# Patient Record
Sex: MALE | Race: WHITE | NOT HISPANIC OR LATINO | Employment: OTHER | URBAN - METROPOLITAN AREA
[De-identification: names, ages, dates, MRNs, and addresses within clinical notes are randomized per-mention and may not be internally consistent; named-entity substitution may affect disease eponyms.]

---

## 2018-10-15 ENCOUNTER — HOSPITAL ENCOUNTER (OUTPATIENT)
Facility: HOSPITAL | Age: 58
Discharge: HOME | End: 2018-10-17
Attending: EMERGENCY MEDICINE | Admitting: HOSPITALIST
Payer: MEDICAID

## 2018-10-15 DIAGNOSIS — K62.1 RECTAL POLYP: Primary | ICD-10-CM

## 2018-10-15 DIAGNOSIS — I10 ESSENTIAL HYPERTENSION: ICD-10-CM

## 2018-10-15 DIAGNOSIS — K62.5 BRIGHT RED BLOOD PER RECTUM: ICD-10-CM

## 2018-10-15 PROCEDURE — 99285 EMERGENCY DEPT VISIT HI MDM: CPT

## 2018-10-16 PROBLEM — I10 ESSENTIAL HYPERTENSION: Status: ACTIVE | Noted: 2018-10-16

## 2018-10-16 PROBLEM — K62.5 BRIGHT RED BLOOD PER RECTUM: Status: ACTIVE | Noted: 2018-10-16

## 2018-10-16 LAB
ABO + RH BLD: NORMAL
ALBUMIN SERPL BCP-MCNC: 4.2 G/DL
ALP SERPL-CCNC: 72 U/L
ALT SERPL W/O P-5'-P-CCNC: 11 U/L
AMPHET+METHAMPHET UR QL: NEGATIVE
ANION GAP SERPL CALC-SCNC: 9 MMOL/L
APTT BLDCRRT: 29.5 SEC
AST SERPL-CCNC: 8 U/L
BARBITURATES UR QL SCN>200 NG/ML: NEGATIVE
BASOPHILS # BLD AUTO: 0.05 K/UL
BASOPHILS # BLD AUTO: 0.06 K/UL
BASOPHILS NFR BLD: 0.5 %
BASOPHILS NFR BLD: 0.5 %
BENZODIAZ UR QL SCN>200 NG/ML: NEGATIVE
BILIRUB SERPL-MCNC: 0.5 MG/DL
BILIRUB UR QL STRIP: NEGATIVE
BLD GP AB SCN CELLS X3 SERPL QL: NORMAL
BUN SERPL-MCNC: 14 MG/DL
BZE UR QL SCN: NORMAL
CALCIUM SERPL-MCNC: 9.6 MG/DL
CANNABINOIDS UR QL SCN: NEGATIVE
CHLORIDE SERPL-SCNC: 106 MMOL/L
CLARITY UR: CLEAR
CO2 SERPL-SCNC: 24 MMOL/L
COLOR UR: ABNORMAL
CREAT SERPL-MCNC: 1 MG/DL
CREAT UR-MCNC: 41.6 MG/DL
DIFFERENTIAL METHOD: ABNORMAL
DIFFERENTIAL METHOD: NORMAL
EOSINOPHIL # BLD AUTO: 0.5 K/UL
EOSINOPHIL # BLD AUTO: 0.6 K/UL
EOSINOPHIL NFR BLD: 5 %
EOSINOPHIL NFR BLD: 5 %
ERYTHROCYTE [DISTWIDTH] IN BLOOD BY AUTOMATED COUNT: 13.2 %
ERYTHROCYTE [DISTWIDTH] IN BLOOD BY AUTOMATED COUNT: 13.3 %
EST. GFR  (AFRICAN AMERICAN): >60 ML/MIN/1.73 M^2
EST. GFR  (NON AFRICAN AMERICAN): >60 ML/MIN/1.73 M^2
ETHANOL SERPL-MCNC: <10 MG/DL
GLUCOSE SERPL-MCNC: 91 MG/DL
GLUCOSE UR QL STRIP: NEGATIVE
HCT VFR BLD AUTO: 42.8 %
HCT VFR BLD AUTO: 43.3 %
HGB BLD-MCNC: 14.7 G/DL
HGB BLD-MCNC: 14.9 G/DL
HGB UR QL STRIP: ABNORMAL
HIV1+2 IGG SERPL QL IA.RAPID: NEGATIVE
INR PPP: 1
KETONES UR QL STRIP: NEGATIVE
LEUKOCYTE ESTERASE UR QL STRIP: NEGATIVE
LIPASE SERPL-CCNC: 43 U/L
LYMPHOCYTES # BLD AUTO: 2.3 K/UL
LYMPHOCYTES # BLD AUTO: 2.6 K/UL
LYMPHOCYTES NFR BLD: 22.7 %
LYMPHOCYTES NFR BLD: 23 %
MCH RBC QN AUTO: 28.8 PG
MCH RBC QN AUTO: 29.3 PG
MCHC RBC AUTO-ENTMCNC: 34.3 G/DL
MCHC RBC AUTO-ENTMCNC: 34.4 G/DL
MCV RBC AUTO: 84 FL
MCV RBC AUTO: 85 FL
METHADONE UR QL SCN>300 NG/ML: NEGATIVE
MICROSCOPIC COMMENT: ABNORMAL
MONOCYTES # BLD AUTO: 0.9 K/UL
MONOCYTES # BLD AUTO: 1.2 K/UL
MONOCYTES NFR BLD: 10.6 %
MONOCYTES NFR BLD: 8.7 %
NEUTROPHILS # BLD AUTO: 6.3 K/UL
NEUTROPHILS # BLD AUTO: 6.9 K/UL
NEUTROPHILS NFR BLD: 60.6 %
NEUTROPHILS NFR BLD: 63.1 %
NITRITE UR QL STRIP: NEGATIVE
OPIATES UR QL SCN: NEGATIVE
PCP UR QL SCN>25 NG/ML: NEGATIVE
PH UR STRIP: 7 [PH] (ref 5–8)
PLATELET # BLD AUTO: 238 K/UL
PLATELET # BLD AUTO: 242 K/UL
PMV BLD AUTO: 11 FL
PMV BLD AUTO: 11.1 FL
POTASSIUM SERPL-SCNC: 3.7 MMOL/L
PROT SERPL-MCNC: 7 G/DL
PROT UR QL STRIP: NEGATIVE
PROTHROMBIN TIME: 10.2 SEC
RBC # BLD AUTO: 5.01 M/UL
RBC # BLD AUTO: 5.18 M/UL
RBC #/AREA URNS HPF: 50 /HPF (ref 0–4)
SODIUM SERPL-SCNC: 139 MMOL/L
SP GR UR STRIP: 1.01 (ref 1–1.03)
TOXICOLOGY INFORMATION: NORMAL
URN SPEC COLLECT METH UR: ABNORMAL
UROBILINOGEN UR STRIP-ACNC: NEGATIVE EU/DL
WBC # BLD AUTO: 11.44 K/UL
WBC # BLD AUTO: 9.99 K/UL

## 2018-10-16 PROCEDURE — 36415 COLL VENOUS BLD VENIPUNCTURE: CPT

## 2018-10-16 PROCEDURE — 96360 HYDRATION IV INFUSION INIT: CPT

## 2018-10-16 PROCEDURE — 85025 COMPLETE CBC W/AUTO DIFF WBC: CPT

## 2018-10-16 PROCEDURE — 25000003 PHARM REV CODE 250: Performed by: EMERGENCY MEDICINE

## 2018-10-16 PROCEDURE — 80307 DRUG TEST PRSMV CHEM ANLYZR: CPT

## 2018-10-16 PROCEDURE — 80053 COMPREHEN METABOLIC PANEL: CPT

## 2018-10-16 PROCEDURE — 85610 PROTHROMBIN TIME: CPT

## 2018-10-16 PROCEDURE — 80320 DRUG SCREEN QUANTALCOHOLS: CPT

## 2018-10-16 PROCEDURE — 86703 HIV-1/HIV-2 1 RESULT ANTBDY: CPT

## 2018-10-16 PROCEDURE — 96361 HYDRATE IV INFUSION ADD-ON: CPT

## 2018-10-16 PROCEDURE — G0378 HOSPITAL OBSERVATION PER HR: HCPCS

## 2018-10-16 PROCEDURE — 25000003 PHARM REV CODE 250: Performed by: INTERNAL MEDICINE

## 2018-10-16 PROCEDURE — 85730 THROMBOPLASTIN TIME PARTIAL: CPT

## 2018-10-16 PROCEDURE — 86850 RBC ANTIBODY SCREEN: CPT

## 2018-10-16 PROCEDURE — 83690 ASSAY OF LIPASE: CPT

## 2018-10-16 PROCEDURE — 81000 URINALYSIS NONAUTO W/SCOPE: CPT

## 2018-10-16 RX ORDER — BENAZEPRIL HYDROCHLORIDE 10 MG/1
10 TABLET ORAL DAILY
Status: DISCONTINUED | OUTPATIENT
Start: 2018-10-16 | End: 2018-10-17 | Stop reason: HOSPADM

## 2018-10-16 RX ORDER — HYDROCHLOROTHIAZIDE 12.5 MG/1
12.5 TABLET ORAL DAILY
Status: DISCONTINUED | OUTPATIENT
Start: 2018-10-16 | End: 2018-10-16

## 2018-10-16 RX ORDER — POLYETHYLENE GLYCOL 3350, SODIUM SULFATE ANHYDROUS, SODIUM BICARBONATE, SODIUM CHLORIDE, POTASSIUM CHLORIDE 236; 22.74; 6.74; 5.86; 2.97 G/4L; G/4L; G/4L; G/4L; G/4L
4000 POWDER, FOR SOLUTION ORAL ONCE
Status: DISCONTINUED | OUTPATIENT
Start: 2018-10-16 | End: 2018-10-16

## 2018-10-16 RX ORDER — ACETAMINOPHEN 325 MG/1
650 TABLET ORAL EVERY 8 HOURS PRN
Status: DISCONTINUED | OUTPATIENT
Start: 2018-10-16 | End: 2018-10-17 | Stop reason: HOSPADM

## 2018-10-16 RX ORDER — FAMOTIDINE 20 MG/1
20 TABLET, FILM COATED ORAL DAILY
Status: DISCONTINUED | OUTPATIENT
Start: 2018-10-16 | End: 2018-10-17 | Stop reason: HOSPADM

## 2018-10-16 RX ORDER — AMOXICILLIN 250 MG
1 CAPSULE ORAL 2 TIMES DAILY
Status: DISCONTINUED | OUTPATIENT
Start: 2018-10-16 | End: 2018-10-17 | Stop reason: HOSPADM

## 2018-10-16 RX ORDER — POLYETHYLENE GLYCOL 3350 17 G/17G
17 POWDER, FOR SOLUTION ORAL DAILY
Status: DISCONTINUED | OUTPATIENT
Start: 2018-10-16 | End: 2018-10-16

## 2018-10-16 RX ORDER — POLYETHYLENE GLYCOL 3350, SODIUM SULFATE ANHYDROUS, SODIUM BICARBONATE, SODIUM CHLORIDE, POTASSIUM CHLORIDE 236; 22.74; 6.74; 5.86; 2.97 G/4L; G/4L; G/4L; G/4L; G/4L
2000 POWDER, FOR SOLUTION ORAL
Status: COMPLETED | OUTPATIENT
Start: 2018-10-16 | End: 2018-10-17

## 2018-10-16 RX ORDER — LORAZEPAM 2 MG/ML
2 INJECTION INTRAMUSCULAR
Status: ACTIVE | OUTPATIENT
Start: 2018-10-16 | End: 2018-10-16

## 2018-10-16 RX ORDER — ONDANSETRON 2 MG/ML
4 INJECTION INTRAMUSCULAR; INTRAVENOUS EVERY 4 HOURS PRN
Status: DISCONTINUED | OUTPATIENT
Start: 2018-10-16 | End: 2018-10-17 | Stop reason: HOSPADM

## 2018-10-16 RX ORDER — LORAZEPAM 2 MG/ML
INJECTION INTRAMUSCULAR
Status: DISCONTINUED
Start: 2018-10-16 | End: 2018-10-16 | Stop reason: WASHOUT

## 2018-10-16 RX ORDER — SODIUM CHLORIDE 0.9 % (FLUSH) 0.9 %
3 SYRINGE (ML) INJECTION
Status: DISCONTINUED | OUTPATIENT
Start: 2018-10-16 | End: 2018-10-17 | Stop reason: HOSPADM

## 2018-10-16 RX ORDER — SODIUM CHLORIDE 9 MG/ML
INJECTION, SOLUTION INTRAVENOUS CONTINUOUS
Status: DISCONTINUED | OUTPATIENT
Start: 2018-10-16 | End: 2018-10-16

## 2018-10-16 RX ADMIN — HYDROCHLOROTHIAZIDE 12.5 MG: 12.5 TABLET ORAL at 08:10

## 2018-10-16 RX ADMIN — DOCUSATE SODIUM AND SENNOSIDES 1 TABLET: 8.6; 5 TABLET, FILM COATED ORAL at 08:10

## 2018-10-16 RX ADMIN — POLYETHYLENE GLYCOL 3350, SODIUM SULFATE ANHYDROUS, SODIUM BICARBONATE, SODIUM CHLORIDE, POTASSIUM CHLORIDE 2000 ML: 236; 22.74; 6.74; 5.86; 2.97 POWDER, FOR SOLUTION ORAL at 06:10

## 2018-10-16 RX ADMIN — FAMOTIDINE 20 MG: 20 TABLET ORAL at 08:10

## 2018-10-16 RX ADMIN — BENAZEPRIL HYDROCHLORIDE 10 MG: 10 TABLET, FILM COATED ORAL at 08:10

## 2018-10-16 RX ADMIN — ACETAMINOPHEN 650 MG: 325 TABLET ORAL at 07:10

## 2018-10-16 RX ADMIN — SODIUM CHLORIDE: 0.9 INJECTION, SOLUTION INTRAVENOUS at 04:10

## 2018-10-16 NOTE — HPI
"57 yo male with history for hypertension, constipation and hemorrhoids who came to hospital for blood per rectum. Reports intermittent for past 2 months would have blood on toilet paper. Denies abdominal pain, NVD, fever/chills, or weight lost.  Never had colonoscopy. In ED, rectal "golf ball sized" mass reduced. Hemoglobin stable. GI plan for colonoscopy tomorrow.   "

## 2018-10-16 NOTE — ED PROVIDER NOTES
"Encounter Date: 10/15/2018     This is a SORT/MSE of a 58 y.o. male presenting to the ED with c/o rectal bleeding and pain since 3:30 pm. Bright red blood noted falling from patient's shorts in triage. Patient denies ever having a colonoscopy. Care will be transferred to an alternate provider when patient is roomed for a full evaluation and final disposition. Patient is aware that he/she is awaiting a room in the emergency department, where another provider will review results, evaluate and treat as needed.   WELLINGTON Clancy DNP     History     Chief Complaint   Patient presents with    Rectal Bleeding     Hx of hemorrhoids. Rectal bleeding since 1500. Denies abd pain, dizziness     This is an emergent evaluation of a 58-year-old male who presents with bleeding from rectum.  Patient reports that he was in his usual state of health today.  He had a bowel movement this afternoon and had scant blood when he wiped.  Shortly prior to arrival, he had the sensation again of having to defecate.  He went to the restroom and defecated pure blood.  He now feels that there is a lump in his rectum.  He was bleeding on his shorts at triage.  He complains of a "hunger pain" in his lower abdomen as well which began after he defecated.  No nausea/vomiting.  Patient has intermittently seen blood with BMs for the last 2 months but denies pain to his rectum or anus with BMs before tonight's incident. Has never had a colonoscopy. No family history of colon cancer.      Smokes tobacco.    Forgot his BP meds today but generally compliant.    Works in construction. Denies chemical exposures.     PMH: HTN, on meds  PSH: none          Review of patient's allergies indicates:  No Known Allergies  Past Medical History:   Diagnosis Date    Hypertension      History reviewed. No pertinent surgical history.  History reviewed. No pertinent family history.  Social History     Tobacco Use    Smoking status: Current Every Day Smoker    Smokeless " tobacco: Never Used   Substance Use Topics    Alcohol use: No    Drug use: No     Review of Systems   Constitutional: Negative for fever.   HENT: Negative for rhinorrhea and sore throat.    Eyes: Negative for visual disturbance.   Respiratory: Negative for shortness of breath.    Cardiovascular: Negative for chest pain.   Gastrointestinal: Positive for blood in stool. Negative for nausea.   Genitourinary: Negative for dysuria and flank pain.   Musculoskeletal: Negative for neck pain.   Skin: Negative for rash.   Neurological: Negative for weakness.   Hematological: Does not bruise/bleed easily.       Physical Exam     Initial Vitals [10/15/18 2345]   BP Pulse Resp Temp SpO2   (!) 200/104 72 18 97.7 °F (36.5 °C) 97 %      MAP       --         Physical Exam    Nursing note and vitals reviewed.  Constitutional: He appears well-developed and well-nourished. He is not diaphoretic.   Awake and alert uncomfortable adult male.   HENT:   Head: Normocephalic and atraumatic.   Mouth/Throat: Oropharynx is clear and moist.   Eyes: Conjunctivae and EOM are normal. Pupils are equal, round, and reactive to light.   Neck: Normal range of motion. Neck supple.   Cardiovascular: Normal rate, regular rhythm, normal heart sounds and intact distal pulses.   No murmur heard.  Pulmonary/Chest: Breath sounds normal. No respiratory distress. He has no wheezes. He has no rhonchi. He has no rales.   Abdominal: Soft. Bowel sounds are normal. He exhibits no distension. There is no tenderness. There is no rebound and no guarding.   Genitourinary:   Genitourinary Comments: Golf ball sized mass at anus. I traced this mass with my finger and it was not a prolapse or a hemorrhoid. It appears to be a pedunculated friable polyp on a long stalk which had prolapsed out of the anus. I was able to reduce the mass with gentle pressure. Prostate is normal size and nontender.   Musculoskeletal: Normal range of motion. He exhibits no edema or tenderness.    Neurological: He is alert and oriented to person, place, and time. He has normal strength.   Moving all extremities   Skin: Skin is warm and dry.   Psychiatric: He has a normal mood and affect.         ED Course   Procedures  Labs Reviewed   COMPREHENSIVE METABOLIC PANEL - Abnormal; Notable for the following components:       Result Value    AST 8 (*)     All other components within normal limits   CBC W/ AUTO DIFFERENTIAL   PROTIME-INR   APTT   LIPASE   RAPID HIV   ALCOHOL,MEDICAL (ETHANOL)   URINALYSIS, REFLEX TO URINE CULTURE   DRUG SCREEN PANEL, URINE EMERGENCY   TYPE & SCREEN          Imaging Results    None          Medical Decision Making:   History:   Old Medical Records: I decided to obtain old medical records.  Old Records Summarized: records from previous admission(s) and records from another hospital.  Initial Assessment:   58 y.o. Male with BRBPR and mass at anus.  Differential Diagnosis:   Ddx includes upper GI bleed, lower GI bleed, hemorrhoid, mass, other.  Clinical Tests:   Lab Tests: Ordered and Reviewed  ED Management:  Exam seems c/w pedunculated rectal polyp which had prolapsed out of anus. I reduced this with gentle pressure. Patient's abdominal and rectal pain resolved with this procedure.     Labs are reassuring.    I discussed this patient with GI on call, Dr. Buck. Patient will require a colonoscopy at some point to fully evaluate for other GI lesions. My concern is that if this patient goes home tonight, he is likely to re-prolapse this mass with a normal BM, and he will certainly prolapse it with the excessive defecating that typically results from a colonoscopy prep; re-prolapse will lead to another ED visit. Consequently, it seems the best option is a flexible sigmoidoscopy in the AM. Dr. Buck has requested a tap water enema prior to this procedure, which I have ordered.    I discussed the diagnosis and plan with patient and his father. Patient requested something to help him  sleep, so I have ordered ativan. I have written courtesy OBS orders. I have discussed the case with nocturnist Dr. Ewing.   Other:   I have discussed this case with another health care provider.                      Clinical Impression:   The primary encounter diagnosis was Rectal polyp. A diagnosis of Bright red blood per rectum was also pertinent to this visit.                Rocio Chaves MD  10/16/18 0214

## 2018-10-16 NOTE — H&P
"[facility] Medicine  History & Physical    Patient Name: Mendoza Kulkarni  MRN: [id number]  Admission Date: 10/15/2018  Attending Physician: Liv Teran MD   Primary Care Provider: To Obtain Unable         Patient information was obtained from patient and ER records.     Subjective:     Principal Problem:<principal problem not specified>    Chief Complaint:   Chief Complaint   Patient presents with    Rectal Bleeding     Hx of hemorrhoids. Rectal bleeding since 1500. Denies abd pain, dizziness        HPI: 57 yo male with history for hypertension, constipation and hemorrhoids who came to hospital for blood per rectum. Reports intermittent for past 2 months would have blood on toilet paper. Denies abdominal pain, NVD, fever/chills, or weight lost.  Never had colonoscopy. In ED, rectal "golf ball sized" mass reduced. Hemoglobin stable. GI plan for colonoscopy tomorrow.     Past Medical History:   Diagnosis Date    Hemorrhoids     Hypertension        History reviewed. No pertinent surgical history.    Review of patient's allergies indicates:  No Known Allergies    No current facility-administered medications on file prior to encounter.      Current Outpatient Medications on File Prior to Encounter   Medication Sig    benazepril-hydrochlorthiazide (LOTENSIN HCT) 10-12.5 mg Tab Take 1 tablet by mouth once daily.     Family History     None        Tobacco Use    Smoking status: Current Every Day Smoker     Packs/day: 0.50     Years: 35.00     Pack years: 17.50    Smokeless tobacco: Never Used   Substance and Sexual Activity    Alcohol use: No     Frequency: Never    Drug use: No    Sexual activity: Not on file     Review of Systems   Constitutional: Negative.    HENT: Negative.    Eyes: Negative.    Respiratory: Negative.    Gastrointestinal: Positive for blood in stool, constipation and rectal pain. Negative for nausea and vomiting.   Endocrine: Negative.    Genitourinary: " Negative.    Musculoskeletal: Negative.    Skin: Negative.    Allergic/Immunologic: Negative.    Neurological: Negative.    Hematological: Negative.    Psychiatric/Behavioral: Negative.      Objective:     Vital Signs (Most Recent):  Temp: 97.8 °F (36.6 °C) (10/16/18 1121)  Pulse: (!) 49 (10/16/18 1121)  Resp: 18 (10/16/18 1121)  BP: 136/65 (10/16/18 1121)  SpO2: 97 % (10/16/18 1121) Vital Signs (24h Range):  Temp:  [97.7 °F (36.5 °C)-98.8 °F (37.1 °C)] 97.8 °F (36.6 °C)  Pulse:  [49-72] 49  Resp:  [14-18] 18  SpO2:  [95 %-98 %] 97 %  BP: (136-200)/() 136/65     Weight: 68.7 kg (151 lb 7.3 oz)  Body mass index is 22.37 kg/m².    Physical Exam   Constitutional: He appears well-nourished. No distress.   HENT:   Head: Atraumatic.   Eyes: EOM are normal.   Neck: Neck supple.   Cardiovascular: Normal rate.   Pulmonary/Chest: Effort normal and breath sounds normal.   Abdominal: Soft. Bowel sounds are normal.   Musculoskeletal: Normal range of motion. He exhibits no edema.   Neurological: He is alert.   Skin: Skin is warm and dry.         CRANIAL NERVES     CN III, IV, VI   Extraocular motions are normal.        Significant Labs: All pertinent labs within the past 24 hours have been reviewed.    Significant Imaging: I have reviewed and interpreted all pertinent imaging results/findings within the past 24 hours.    Assessment/Plan:     Essential hypertension    Blood pressure creeping up. Hold HCTZ. Home benazepril. Hold IVF for now.           Bright red blood per rectum    See HPI. Currently no BBPR. Hgb stable. GI following. Plan for colon prep this afternoon. Hold hctz.           VTE Risk Mitigation (From admission, onward)        Ordered     IP VTE LOW RISK PATIENT  Once      10/16/18 0331     Place JOSE hose  Until discontinued      10/16/18 0331     Place sequential compression device  Until discontinued      10/16/18 0331             Wendi Gregorio NP  Department of Hospital Medicine   [facility]  [facility]

## 2018-10-16 NOTE — PLAN OF CARE
"TN met with pt. TN explained her role in Care Management. Pt voiced understanding. TN inquired about HELP AT HOME. Pt stated that he will have his dad at home to help for support. TN voiced understanding. TN inquired about responsibilities when it comes to  MANAGING HIS HEALTH at home and what it entails. Pt inquired about details. TN informed pt of RESPONSIBILITIES of:    1. Follow up appointments  2. Getting Prescriptions filled  3. Taking medications as prescribed.     Pt voiced understanding.  TN explained "My Health Packet" blue folder and the pink and green tabs that are on the folder as well.  Pt voiced understanding.     Pt's pharmacy:   [facility] [place] [facility] [place] - [address]  [address]  [place] [address]  Phone: [phone] Fax: [phone]    Pt's preference for appointments: No preference       10/16/18 1626   Discharge Assessment   Assessment Type Discharge Planning Assessment   Confirmed/corrected address and phone number on facesheet? Yes   Assessment information obtained from? Patient   Expected Length of Stay (days) 1   Communicated expected length of stay with patient/caregiver yes   Prior to hospitilization cognitive status: Alert/Oriented   Prior to hospitalization functional status: Independent   Current cognitive status: Alert/Oriented   Current Functional Status: Independent   Lives With parent(s)  (Lives with Caren Bojorquez)   Able to Return to Prior Arrangements yes   Is patient able to care for self after discharge? Yes   Who are your caregiver(s) and their phone number(s)? (Caren Bojorquez [facility]; hs a new government phone, does not know the number.)   Patient's perception of discharge disposition home or selfcare   Readmission Within The Last 30 Days no previous admission in last 30 days   Patient currently being followed by outpatient case management? No   Patient currently receives any other outside agency services? No   Equipment Currently Used at Home " none   Do you have any problems affording any of your prescribed medications? No   Is the patient taking medications as prescribed? yes   Does the patient have transportation home? Yes   Transportation Available car;family or friend will provide   Does the patient receive services at the [facility]? No   Discharge Plan A Home with family   Patient/Family In Agreement With Plan yes

## 2018-10-16 NOTE — CONSULTS
"Chief Complaint: "I have rectal bleeding."    HPI:  The patient is a 58 year old man with a history of HTN presenting with rectal bleeding.  He has been having intermittent rectal bleeding for 2 months, however, yesterday, it became more significant.  The patient states the bleeding was only on the toilet tissue and not the water or inside the stool.  He strained to have a bowel movement.  Normally, he does not have abdominal pain, weight loss, nausea, emesis, diarrhea, or constipation.  He does not take NSAIDs or use anticoagulation, however, he has been using cocaine after his tooth was pulled out.  The patient has never undergone a colonoscopy.    Past Medical History:   Diagnosis Date    Hemorrhoids     Hypertension      History reviewed. No pertinent surgical history.    History reviewed. No pertinent family history.    Social History     Socioeconomic History    Marital status: Single     Spouse name: Not on file    Number of children: Not on file    Years of education: Not on file    Highest education level: Not on file   Social Needs    Financial resource strain: Not on file    Food insecurity - worry: Not on file    Food insecurity - inability: Not on file    Transportation needs - medical: Not on file    Transportation needs - non-medical: Not on file   Occupational History    Not on file   Tobacco Use    Smoking status: Current Every Day Smoker     Packs/day: 0.50     Years: 35.00     Pack years: 17.50    Smokeless tobacco: Never Used   Substance and Sexual Activity    Alcohol use: No     Frequency: Never    Drug use: No    Sexual activity: Not on file   Other Topics Concern    Not on file   Social History Narrative    Not on file        benazepril  10 mg Oral Daily    famotidine  20 mg Oral Daily    hydroCHLOROthiazide  12.5 mg Oral Daily    lorazepam  2 mg Intravenous ED 1 Time    polyethylene glycol  4,000 mL Oral Once    senna-docusate 8.6-50 mg  1 tablet Oral BID       Review " "of patient's allergies indicates:  No Known Allergies    ROS:  No chest pain or dyspnea.  No dysuria.  No heartburn or dysphagia.  Otherwise as stated above.  Ten other systems negative.    Vitals:    10/16/18 0142 10/16/18 0245 10/16/18 0335 10/16/18 0412   BP: (!) 162/94 (!) 154/89 (!) 165/91    BP Location:   Right arm    Patient Position:   Lying    Pulse: (!) 54 (!) 58 (!) 51 (!) 50   Resp:  14 18    Temp:  98.8 °F (37.1 °C) 97.9 °F (36.6 °C)    TempSrc:   Oral    SpO2: 95% 98% 97%    Weight:   68.7 kg (151 lb 7.3 oz)    Height:   5' 9" (1.753 m)      P.E.:  GEN: A x O x 3, NAD  SKIN: No jaundice  HEENT: EOMI, PERRL, anicteric sclera  CV: RRR, no M/R/G  Chest: CTA B  Abdomen: soft, NTND, normoactive BS  Ext: No C/C/E.  2+ dorsalis pedis pulses B  Neuro: No asterixes or tremors.  CN II-XII intact  Musculoskeletal: 5/5 strength bilaterally    Labs:    Recent Results (from the past 336 hour(s))   CBC auto differential    Collection Time: 10/16/18  5:24 AM   Result Value Ref Range    WBC 11.44 3.90 - 12.70 K/uL    Hemoglobin 14.9 14.0 - 18.0 g/dL    Hematocrit 43.3 40.0 - 54.0 %    Platelets 238 150 - 350 K/uL   CBC auto differential    Collection Time: 10/16/18 12:05 AM   Result Value Ref Range    WBC 9.99 3.90 - 12.70 K/uL    Hemoglobin 14.7 14.0 - 18.0 g/dL    Hematocrit 42.8 40.0 - 54.0 %    Platelets 242 150 - 350 K/uL     CMP  Sodium   Date Value Ref Range Status   10/16/2018 139 136 - 145 mmol/L Final     Potassium   Date Value Ref Range Status   10/16/2018 3.7 3.5 - 5.1 mmol/L Final     Chloride   Date Value Ref Range Status   10/16/2018 106 95 - 110 mmol/L Final     CO2   Date Value Ref Range Status   10/16/2018 24 23 - 29 mmol/L Final     Glucose   Date Value Ref Range Status   10/16/2018 91 70 - 110 mg/dL Final     BUN, Bld   Date Value Ref Range Status   10/16/2018 14 6 - 20 mg/dL Final     Creatinine   Date Value Ref Range Status   10/16/2018 1.0 0.5 - 1.4 mg/dL Final     Calcium   Date Value Ref Range " Status   10/16/2018 9.6 8.7 - 10.5 mg/dL Final     Total Protein   Date Value Ref Range Status   10/16/2018 7.0 6.0 - 8.4 g/dL Final     Albumin   Date Value Ref Range Status   10/16/2018 4.2 3.5 - 5.2 g/dL Final     Total Bilirubin   Date Value Ref Range Status   10/16/2018 0.5 0.1 - 1.0 mg/dL Final     Comment:     For infants and newborns, interpretation of results should be based  on gestational age, weight and in agreement with clinical  observations.  Premature Infant recommended reference ranges:  Up to 24 hours.............<8.0 mg/dL  Up to 48 hours............<12.0 mg/dL  3-5 days..................<15.0 mg/dL  6-29 days.................<15.0 mg/dL       Alkaline Phosphatase   Date Value Ref Range Status   10/16/2018 72 55 - 135 U/L Final     AST   Date Value Ref Range Status   10/16/2018 8 (L) 10 - 40 U/L Final     ALT   Date Value Ref Range Status   10/16/2018 11 10 - 44 U/L Final     Anion Gap   Date Value Ref Range Status   10/16/2018 9 8 - 16 mmol/L Final     eGFR if    Date Value Ref Range Status   10/16/2018 >60 >60 mL/min/1.73 m^2 Final     eGFR if non    Date Value Ref Range Status   10/16/2018 >60 >60 mL/min/1.73 m^2 Final     Comment:     Calculation used to obtain the estimated glomerular filtration  rate (eGFR) is the CKD-EPI equation.          Recent Labs   Lab  10/16/18   0005   INR  1.0   APTT  29.5       A/P:  The patient is a 58 year old man with a history of HTN presenting with rectal bleeding.  1.  Rectal Bleeding - a rectal exam was performed in the Emergency Room and it was thought he had a prolapsed rectal polyp, which was pushed back in.  As his H/H is normal and he describes the bleeding on the toilet tissue, this could be hemorrhoidal, among other things.  The patient wishes to undergo a full colonoscopy tomorrow after he has taken a prep rather than undergoing a flexible sigmoidoscopy now and a colonoscopy in the outpatient setting.    Thank you for  this consult.

## 2018-10-16 NOTE — ED TRIAGE NOTES
Pt presents to ED with c/o intermittent bright red rectal bleeding since 1500 10/15/2018. Pt reports he had a formed stool bowel movement and when he wiped noticed bright red blood. Pt reports it has been bleeding intermittently since onset. He reports taking a full dose aspirin yesterday for back pain. Reports hx hemorrhoids.

## 2018-10-16 NOTE — NURSING
Pt arrived on unit from the ED dept via stretcher accompanied by transport. Pt AAOx4 with no c/o pain. Telemetry monitor in place. Saline lock in place to site is clear. Pt  Admission assessment in progress, pt informed of MD orders, oriented to environment and safety maintained with bed low side rails up x2 with nurse call bell within reach

## 2018-10-16 NOTE — SUBJECTIVE & OBJECTIVE
Past Medical History:   Diagnosis Date    Hemorrhoids     Hypertension        History reviewed. No pertinent surgical history.    Review of patient's allergies indicates:  No Known Allergies    No current facility-administered medications on file prior to encounter.      Current Outpatient Medications on File Prior to Encounter   Medication Sig    benazepril-hydrochlorthiazide (LOTENSIN HCT) 10-12.5 mg Tab Take 1 tablet by mouth once daily.     Family History     None        Tobacco Use    Smoking status: Current Every Day Smoker     Packs/day: 0.50     Years: 35.00     Pack years: 17.50    Smokeless tobacco: Never Used   Substance and Sexual Activity    Alcohol use: No     Frequency: Never    Drug use: No    Sexual activity: Not on file     Review of Systems   Constitutional: Negative.    HENT: Negative.    Eyes: Negative.    Respiratory: Negative.    Gastrointestinal: Positive for blood in stool, constipation and rectal pain. Negative for nausea and vomiting.   Endocrine: Negative.    Genitourinary: Negative.    Musculoskeletal: Negative.    Skin: Negative.    Allergic/Immunologic: Negative.    Neurological: Negative.    Hematological: Negative.    Psychiatric/Behavioral: Negative.      Objective:     Vital Signs (Most Recent):  Temp: 97.8 °F (36.6 °C) (10/16/18 1121)  Pulse: (!) 49 (10/16/18 1121)  Resp: 18 (10/16/18 1121)  BP: 136/65 (10/16/18 1121)  SpO2: 97 % (10/16/18 1121) Vital Signs (24h Range):  Temp:  [97.7 °F (36.5 °C)-98.8 °F (37.1 °C)] 97.8 °F (36.6 °C)  Pulse:  [49-72] 49  Resp:  [14-18] 18  SpO2:  [95 %-98 %] 97 %  BP: (136-200)/() 136/65     Weight: 68.7 kg (151 lb 7.3 oz)  Body mass index is 22.37 kg/m².    Physical Exam   Constitutional: He appears well-nourished. No distress.   HENT:   Head: Atraumatic.   Eyes: EOM are normal.   Neck: Neck supple.   Cardiovascular: Normal rate.   Pulmonary/Chest: Effort normal and breath sounds normal.   Abdominal: Soft. Bowel sounds are normal.    Musculoskeletal: Normal range of motion. He exhibits no edema.   Neurological: He is alert.   Skin: Skin is warm and dry.         CRANIAL NERVES     CN III, IV, VI   Extraocular motions are normal.        Significant Labs: All pertinent labs within the past 24 hours have been reviewed.    Significant Imaging: I have reviewed and interpreted all pertinent imaging results/findings within the past 24 hours.

## 2018-10-16 NOTE — ASSESSMENT & PLAN NOTE
See HPI. Currently no BBPR. Hgb stable. GI following. Plan for colon prep this afternoon. Hold hctz.

## 2018-10-17 ENCOUNTER — ANESTHESIA (OUTPATIENT)
Dept: ENDOSCOPY | Facility: HOSPITAL | Age: 58
End: 2018-10-17
Payer: MEDICAID

## 2018-10-17 ENCOUNTER — ANESTHESIA EVENT (OUTPATIENT)
Dept: ENDOSCOPY | Facility: HOSPITAL | Age: 58
End: 2018-10-17
Payer: MEDICAID

## 2018-10-17 VITALS
DIASTOLIC BLOOD PRESSURE: 69 MMHG | HEIGHT: 69 IN | OXYGEN SATURATION: 98 % | WEIGHT: 151 LBS | RESPIRATION RATE: 18 BRPM | TEMPERATURE: 98 F | BODY MASS INDEX: 22.36 KG/M2 | SYSTOLIC BLOOD PRESSURE: 117 MMHG | HEART RATE: 62 BPM

## 2018-10-17 LAB
BASOPHILS # BLD AUTO: 0.05 K/UL
BASOPHILS NFR BLD: 0.6 %
DIFFERENTIAL METHOD: NORMAL
EOSINOPHIL # BLD AUTO: 0.5 K/UL
EOSINOPHIL NFR BLD: 6.2 %
ERYTHROCYTE [DISTWIDTH] IN BLOOD BY AUTOMATED COUNT: 13.3 %
HCT VFR BLD AUTO: 45.7 %
HGB BLD-MCNC: 15.8 G/DL
INR PPP: 1
LYMPHOCYTES # BLD AUTO: 1.9 K/UL
LYMPHOCYTES NFR BLD: 21.8 %
MCH RBC QN AUTO: 29.4 PG
MCHC RBC AUTO-ENTMCNC: 34.6 G/DL
MCV RBC AUTO: 85 FL
MONOCYTES # BLD AUTO: 1 K/UL
MONOCYTES NFR BLD: 11.5 %
NEUTROPHILS # BLD AUTO: 5.3 K/UL
NEUTROPHILS NFR BLD: 59.9 %
PLATELET # BLD AUTO: 227 K/UL
PMV BLD AUTO: 11 FL
PROTHROMBIN TIME: 10.3 SEC
RBC # BLD AUTO: 5.37 M/UL
WBC # BLD AUTO: 8.78 K/UL

## 2018-10-17 PROCEDURE — 00811 ANES LWR INTST NDSC NOS: CPT | Performed by: INTERNAL MEDICINE

## 2018-10-17 PROCEDURE — G0378 HOSPITAL OBSERVATION PER HR: HCPCS

## 2018-10-17 PROCEDURE — 88305 TISSUE EXAM BY PATHOLOGIST: CPT | Performed by: PATHOLOGY

## 2018-10-17 PROCEDURE — D9220A PRA ANESTHESIA: Mod: ANES,,, | Performed by: ANESTHESIOLOGY

## 2018-10-17 PROCEDURE — 27201028 HC NEEDLE, SCLERO: Performed by: INTERNAL MEDICINE

## 2018-10-17 PROCEDURE — 88305 TISSUE EXAM BY PATHOLOGIST: CPT | Mod: 26,,, | Performed by: PATHOLOGY

## 2018-10-17 PROCEDURE — 27201089 HC SNARE, DISP (ANY): Performed by: INTERNAL MEDICINE

## 2018-10-17 PROCEDURE — 94760 N-INVAS EAR/PLS OXIMETRY 1: CPT

## 2018-10-17 PROCEDURE — 45381 COLONOSCOPY SUBMUCOUS NJX: CPT | Performed by: INTERNAL MEDICINE

## 2018-10-17 PROCEDURE — 37000009 HC ANESTHESIA EA ADD 15 MINS: Performed by: INTERNAL MEDICINE

## 2018-10-17 PROCEDURE — 25000003 PHARM REV CODE 250: Performed by: NURSE ANESTHETIST, CERTIFIED REGISTERED

## 2018-10-17 PROCEDURE — 45385 COLONOSCOPY W/LESION REMOVAL: CPT | Performed by: INTERNAL MEDICINE

## 2018-10-17 PROCEDURE — 85025 COMPLETE CBC W/AUTO DIFF WBC: CPT

## 2018-10-17 PROCEDURE — D9220A PRA ANESTHESIA: Mod: CRNA,,, | Performed by: NURSE ANESTHETIST, CERTIFIED REGISTERED

## 2018-10-17 PROCEDURE — 25000003 PHARM REV CODE 250: Performed by: INTERNAL MEDICINE

## 2018-10-17 PROCEDURE — 25000003 PHARM REV CODE 250: Performed by: EMERGENCY MEDICINE

## 2018-10-17 PROCEDURE — 37000008 HC ANESTHESIA 1ST 15 MINUTES: Performed by: INTERNAL MEDICINE

## 2018-10-17 PROCEDURE — 63600175 PHARM REV CODE 636 W HCPCS: Performed by: NURSE ANESTHETIST, CERTIFIED REGISTERED

## 2018-10-17 PROCEDURE — 85610 PROTHROMBIN TIME: CPT

## 2018-10-17 RX ORDER — PROPOFOL 10 MG/ML
VIAL (ML) INTRAVENOUS
Status: DISCONTINUED | OUTPATIENT
Start: 2018-10-17 | End: 2018-10-17

## 2018-10-17 RX ORDER — PHENYLEPHRINE HCL IN 0.9% NACL 1 MG/10 ML
SYRINGE (ML) INTRAVENOUS
Status: DISCONTINUED
Start: 2018-10-17 | End: 2018-10-17 | Stop reason: HOSPADM

## 2018-10-17 RX ORDER — PHENYLEPHRINE HYDROCHLORIDE 10 MG/ML
INJECTION INTRAVENOUS
Status: DISCONTINUED | OUTPATIENT
Start: 2018-10-17 | End: 2018-10-17

## 2018-10-17 RX ORDER — GLYCOPYRROLATE 0.2 MG/ML
INJECTION INTRAMUSCULAR; INTRAVENOUS
Status: DISCONTINUED | OUTPATIENT
Start: 2018-10-17 | End: 2018-10-17

## 2018-10-17 RX ORDER — PROPOFOL 10 MG/ML
VIAL (ML) INTRAVENOUS
Status: DISCONTINUED
Start: 2018-10-17 | End: 2018-10-17 | Stop reason: HOSPADM

## 2018-10-17 RX ORDER — GLYCOPYRROLATE 0.2 MG/ML
INJECTION INTRAMUSCULAR; INTRAVENOUS
Status: DISCONTINUED
Start: 2018-10-17 | End: 2018-10-17 | Stop reason: HOSPADM

## 2018-10-17 RX ORDER — SODIUM CHLORIDE 9 MG/ML
INJECTION, SOLUTION INTRAVENOUS ONCE
Status: COMPLETED | OUTPATIENT
Start: 2018-10-17 | End: 2018-10-17

## 2018-10-17 RX ORDER — LIDOCAINE HYDROCHLORIDE 20 MG/ML
INJECTION, SOLUTION EPIDURAL; INFILTRATION; INTRACAUDAL; PERINEURAL
Status: DISCONTINUED
Start: 2018-10-17 | End: 2018-10-17 | Stop reason: HOSPADM

## 2018-10-17 RX ORDER — LIDOCAINE HCL/PF 100 MG/5ML
SYRINGE (ML) INTRAVENOUS
Status: DISCONTINUED | OUTPATIENT
Start: 2018-10-17 | End: 2018-10-17

## 2018-10-17 RX ADMIN — PROPOFOL 40 MG: 10 INJECTION, EMULSION INTRAVENOUS at 01:10

## 2018-10-17 RX ADMIN — SODIUM CHLORIDE: 0.9 INJECTION, SOLUTION INTRAVENOUS at 01:10

## 2018-10-17 RX ADMIN — PROPOFOL 140 MG: 10 INJECTION, EMULSION INTRAVENOUS at 01:10

## 2018-10-17 RX ADMIN — PHENYLEPHRINE HYDROCHLORIDE 50 MCG: 10 INJECTION INTRAVENOUS at 02:10

## 2018-10-17 RX ADMIN — PROPOFOL 40 MG: 10 INJECTION, EMULSION INTRAVENOUS at 02:10

## 2018-10-17 RX ADMIN — PROPOFOL 30 MG: 10 INJECTION, EMULSION INTRAVENOUS at 01:10

## 2018-10-17 RX ADMIN — GLYCOPYRROLATE 0.2 MG: 0.2 INJECTION, SOLUTION INTRAMUSCULAR; INTRAVENOUS at 01:10

## 2018-10-17 RX ADMIN — POLYETHYLENE GLYCOL 3350, SODIUM SULFATE ANHYDROUS, SODIUM BICARBONATE, SODIUM CHLORIDE, POTASSIUM CHLORIDE 2000 ML: 236; 22.74; 6.74; 5.86; 2.97 POWDER, FOR SOLUTION ORAL at 02:10

## 2018-10-17 RX ADMIN — PROPOFOL 30 MG: 10 INJECTION, EMULSION INTRAVENOUS at 02:10

## 2018-10-17 RX ADMIN — BENAZEPRIL HYDROCHLORIDE 10 MG: 10 TABLET, FILM COATED ORAL at 10:10

## 2018-10-17 RX ADMIN — PHENYLEPHRINE HYDROCHLORIDE 200 MCG: 10 INJECTION INTRAVENOUS at 01:10

## 2018-10-17 RX ADMIN — LIDOCAINE HYDROCHLORIDE 60 MG: 20 INJECTION, SOLUTION INTRAVENOUS at 01:10

## 2018-10-17 NOTE — OR NURSING
MD at bedside with patient and family.  Results and follow-up discussed, patient and family verbalized understanding.  No acute distress voiced or observed.

## 2018-10-17 NOTE — PLAN OF CARE
Problem: Patient Care Overview  Goal: Plan of Care Review   10/17/18 0622   Coping/Psychosocial   Plan Of Care Reviewed With patient       Comments: Patient remained free from injury throughout the shift. Vital signs remained WNL. No complaints of pain and no signs of respiratory distress noted. GI consulted and will plan for colonoscopy. NPO status maintained. Patient updated on plan of care. Bed alarm maintained and call light within reach. Patient stable and will continue to be monitored.

## 2018-10-17 NOTE — PROVATION PATIENT INSTRUCTIONS
Discharge Summary/Instructions after an Endoscopic Procedure  Patient Name: Mendoza Kulkarni  Patient MRN: [id number]  Patient YOB: 1960 Wednesday, October 17, 2018  Rolando Mcclendon MD  RESTRICTIONS:  During your procedure today, you received medications for sedation.  These   medications may affect your judgment, balance and coordination.  Therefore,   for 24 hours, you have the following restrictions:   - DO NOT drive a car, operate machinery, make legal/financial decisions,   sign important papers or drink alcohol.    ACTIVITY:  Today: no heavy lifting, straining or running due to procedural   sedation/anesthesia.  The following day: return to full activity including work.  DIET:  Eat and drink normally unless instructed otherwise.     TREATMENT FOR COMMON SIDE EFFECTS:  - Mild abdominal pain, nausea, belching, bloating or excessive gas:  rest,   eat lightly and use a heating pad.  - Sore Throat: treat with throat lozenges and/or gargle with warm salt   water.  - Because air was used during the procedure, expelling large amounts of air   from your rectum or belching is normal.  - If a bowel prep was taken, you may not have a bowel movement for 1-3 days.    This is normal.  SYMPTOMS TO WATCH FOR AND REPORT TO YOUR PHYSICIAN:  1. Abdominal pain or bloating, other than gas cramps.  2. Chest pain.  3. Back pain.  4. Signs of infection such as: chills or fever occurring within 24 hours   after the procedure.  5. Rectal bleeding, which would show as bright red, maroon, or black stools.   (A tablespoon of blood from the rectum is not serious, especially if   hemorrhoids are present.)  6. Vomiting.  7. Weakness or dizziness.  GO DIRECTLY TO THE NEAREST EMERGENCY ROOM IF YOU HAVE ANY OF THE FOLLOWING:      Difficulty breathing              Chills and/or fever over 101 F   Persistent vomiting and/or vomiting blood   Severe abdominal pain   Severe chest pain   Black, tarry stools   Bleeding- more than one  tablespoon   Any other symptom or condition that you feel may need urgent attention  Your doctor recommends these additional instructions:  If any biopsies were taken, your doctors clinic will contact you in 1 to 2   weeks with any results.  - Await pathology results.   - Repeat colonoscopy in 1 year for surveillance.   - The patient's H/H has been normal.  He can follow-up in GI Clinic in 2   weeks. If the large rectosigmoid polyp is malignant, he can be referred to   Surgery and a CT scan can be obtained.  Please call GI with any further   issues.  - Return patient to hospital [facility] for ongoing care.  For questions, problems or results please call your physician - Rolando Mcclendon MD at Work:  [phone].  [facility] can be reached at [phone]     IF A COMPLICATION OR EMERGENCY SITUATION ARISES AND YOU ARE UNABLE TO REACH   YOUR PHYSICIAN - GO DIRECTLY TO THE EMERGENCY ROOM.  Rolando Mcclendon MD  10/17/2018 2:21:31 PM  This report has been verified and signed electronically.  [employer]

## 2018-10-17 NOTE — NURSING
Report received from TONO Miguel. Patient care assumed. Patient is AAOx4 observed lying in bed with cardiac monitoring in place and family at the bedside. Vital signs stable and found in flow sheets with complete patient assessment. Skin dry and intact. No complaints of pain and no signs of respiratory distress noted. Call light in reach and patient instructed to inform the nurse if anything is needed. Patient stable and will continue to be monitored.

## 2018-10-17 NOTE — PLAN OF CARE
10/17/18 1606   Final Note   Assessment Type Final Discharge Note   Discharge Disposition Home   What phone number can be called within the next 1-3 days to see how you are doing after discharge? (Listed in chart)   Hospital Follow Up  Appt(s) scheduled? Yes   Discharge plans and expectations educations in teach back method with documentation complete? Yes   Right Care Referral Info   Post Acute Recommendation No Care

## 2018-10-17 NOTE — DISCHARGE SUMMARY
[facility]  Discharge Summary      Admit Date: 10/15/2018    Discharge Date and Time:  10/17/2018 2:14 PM    Attending Physician: Liv Teran MD     Reason for Admission: Hematochezia    Procedures Performed: Procedure(s) (LRB):  COLONOSCOPY (Left)    Hospital Course (synopsis of major diagnoses, care, treatment, and services provided during the course of the hospital stay): Ongoing     Consults: GI    Significant Diagnostic Studies: Colonoscopy    Final Diagnoses:    Principal Problem: <principal problem not specified>   Secondary Diagnoses: Colonoscopy    Discharged Condition: good    Disposition: Admitted as Inpatient    Follow Up/Patient Instructions: Follow-up with referring physician             Resume previous diet and activity.    Medications:  Transfer Medications (for Discharge Readmit only):   Current Facility-Administered Medications   Medication Dose Route Frequency Provider Last Rate Last Dose    acetaminophen tablet 650 mg  650 mg Oral Q8H PRN Rocio Chaves MD   650 mg at 10/16/18 0757    benazepril tablet 10 mg  10 mg Oral Daily Rocio Chaves MD   10 mg at 10/17/18 1034    famotidine tablet 20 mg  20 mg Oral Daily Rocio Chaves MD   20 mg at 10/16/18 0801    ondansetron injection 4 mg  4 mg Intravenous Q4H PRN Rocio Chaves MD        promethazine (PHENERGAN) 6.25 mg in dextrose 5 % 50 mL IVPB  6.25 mg Intravenous Q6H PRN Rocio Chaves MD        senna-docusate 8.6-50 mg per tablet 1 tablet  1 tablet Oral BID Rocio Chaves MD   1 tablet at 10/16/18 0801    sodium chloride 0.9% flush 3 mL  3 mL Intravenous PRN Rolando Mcclendon MD         Facility-Administered Medications Ordered in Other Encounters   Medication Dose Route Frequency Provider Last Rate Last Dose    glycopyrrolate injection    PRN Otis Cartagena, CRNA   0.2 mg at 10/17/18 1350    lidocaine (cardiac) injection   Intravenous PRN Otis Cartagena CRNA   60 mg at 10/17/18 1340     phenylephrine injection    PRN Otispadma Phans, CRNA   50 mcg at 10/17/18 1405    propofol (DIPRIVAN) 10 mg/mL infusion   Intravenous PRN Otis Cartagena, CRNA   30 mg at 10/17/18 1405     No discharge procedures on file.  Follow-up Information     Mendoza Townsend III, MD On 10/30/2018.    Specialty:  Internal Medicine  Why:  Outpatient Services PCP Follow-Up Tuesday at 2:45 PM.  Contact information:  [address] [place], [place] [address]  [place] [address]  [phone]

## 2018-10-17 NOTE — PROGRESS NOTES
Follow-up Information     Mendoza Townsend III, MD On 10/30/2018.    Specialty:  Internal Medicine  Why:  Outpatient Services PCP Follow-Up Tuesday at 2:45 PM.  Contact information:  [address]  [facility]  [place] [address]  [phone]               PLEASE BRING TO ALL FOLLOW UP APPOINTMENTS:   1) A COPY YOUR DISCHARGE INSTRUCTIONS   2) ALL MEDICINES YOU ARE CURRENTLY TAKING IN THEIR ORIGINAL BOTTLES   3) IDENTIFICATION CARD   4) Social Security card   5) Proof of income, if no income, a letter from the person you live with that supports you       **PLEASE ARRIVE 30 MINUTES AHEAD OF SCHEDULED APPOINTMENT TIME   ++PLEASE CALL 48 HOURS IN ADVANCE IF YOU MUST RESCHEDULE YOUR APPOINTMENT DAY AND/OR TIME   +++Cost of Visit is $25-45 depending on services provided  [facility]    WRITTEN HEALTHCARE AND DISCHARGE INFORMATION                        Help at Home           [phone]  After discharge for assistance [facility] On Call Nurse Care Line 24/7  Assistance    Things You are responsible For To Manage Your Care At Home:  1.    Getting your prescriptions filled   2.    Taking your medications as directed, DO NOT MISS ANY DOSES!  3.    Going to your follow-up doctor appointment. This is important because it  allow the doctor to monitor your progress and determine if  any changes need to made to your treatment plan.     Thank you for choosing [facility] for your care.  Please answer any calls you may receive from [facility] we want to continue to support you as you manage your healthcare needs. [facility] is happy to have the opportunity to serve you.     Sincerely,  Your [facility] Team,  [place], [place];  [phone]

## 2018-10-17 NOTE — OR NURSING
Report given to primary nurse.   Detail Level: Detailed Biopsy Type: H and E Anesthesia Volume In Cc: 0.5 Electrodesiccation And Curettage Text: The wound bed was treated with electrodesiccation and curettage after the biopsy was performed. Electrodesiccation Text: The wound bed was treated with electrodesiccation after the biopsy was performed. Bill 27321 For Specimen Handling/Conveyance To Laboratory?: no Size Of Lesion In Cm: 0 Anesthesia Type: 1% lidocaine without epinephrine Post-Care Instructions: I reviewed with the patient in detail post-care instructions. Patient is to wear sun protection, and avoid picking at any of the treated lesions. Pt was advised to wash daily with gentle soap and water and may apply Vaseline to crusted or scabbing areas. Lab Facility: [id number] Hemostasis: Aluminum Chloride Was A Bandage Applied: Yes Silver Nitrate Text: The wound bed was treated with silver nitrate after the biopsy was performed. Billing Type: Third-Party Bill Depth Of Biopsy: dermis Dressing: bandage Lab: [id number] Type Of Destruction Used: Curettage Body Location Override (Optional - Billing Will Still Be Based On Selected Body Map Location If Applicable): sternal notch Cryotherapy Text: The wound bed was treated with cryotherapy after the biopsy was performed. Wound Care: No ointment Notification Instructions: Results will be faxed to PCP once they are available. Biopsy Method: Personna blade Curettage Text: The wound bed was treated with curettage after the biopsy was performed.

## 2018-10-17 NOTE — PROGRESS NOTES
"EDUCATION:  TN provided with educational information on GI Disorders.  Information reviewed and placed in :My Healthcare Packet" to be brought home for pt to use as resource after discharge.  Information included:  signs and symptoms to look for and call the doctor if experiencing, and symptoms that may indicate a medical emergency: CALL 911.      All questions answered.  Teach back method used.    Patient stated, "I will go to ER if breathing problems or chest pain ".    TN informed floor nurse, vernon, care management is complete and cn proceed with discharge teaching.        "

## 2018-10-17 NOTE — TRANSFER OF CARE
"Anesthesia Transfer of Care Note    Patient: Mendoza Kulkarni    Procedure(s) Performed: Procedure(s) (LRB):  COLONOSCOPY (Left)    Patient location: GI    Anesthesia Type: general    Transport from OR: Transported from OR on room air with adequate spontaneous ventilation    Post pain: adequate analgesia    Post assessment: no apparent anesthetic complications and tolerated procedure well    Post vital signs: stable    Level of consciousness: sedated and responds to stimulation    Nausea/Vomiting: no nausea/vomiting    Complications: none    Transfer of care protocol was followed      Last vitals:   Visit Vitals  BP (!) 95/55 (BP Location: Left arm, Patient Position: Lying)   Pulse 82   Temp 36.8 °C (98.2 °F) (Oral)   Resp 18   Ht 5' 9" (1.753 m)   Wt 68.5 kg (151 lb 0.2 oz)   SpO2 96%   BMI 22.30 kg/m²     "

## 2018-10-17 NOTE — PROGRESS NOTES
TN contacted [facility] at [phone]; spoke with Delroy; scheduled earliest appt on 10/30/18 at 2:45 PM.

## 2018-10-18 NOTE — HOSPITAL COURSE
Patient admitted for hematochezia. GI consulted. On 10/17, colonoscopy showed non-bleeding internal hemorrhoids, three 4 to 5 mm polyps in the sigmoid colon, one 5 mm polyp in the ascending colon, one 50 mm polyp at the recto-sigmoid colon mild diverticulosis in the sigmoid colon, and was no evidence of diverticular bleeding. No further bleeding and hgb stable while stay. Follow up with GI for biopsy results. If the large rectosigmoid polyp is malignant then will need further imaging and refer to Surgery. Patient stable for discharge.

## 2018-10-18 NOTE — DISCHARGE SUMMARY
"[facility] Medicine  Discharge Summary      Patient Name: Mendoza Kulkarni  MRN: [id number]  Admission Date: 10/15/2018  Hospital Length of Stay: 0 days  Discharge Date and Time:  10/17/2018   Attending Physician: No att. providers found   Discharging Provider: Wendi Gregorio NP  Primary Care Provider: Mendoza Townsend III, MD      HPI:   59 yo male with history for hypertension, constipation and hemorrhoids who came to hospital for blood per rectum. Reports intermittent for past 2 months would have blood on toilet paper. Denies abdominal pain, NVD, fever/chills, or weight lost.  Never had colonoscopy. In ED, rectal "golf ball sized" mass reduced. Hemoglobin stable. GI plan for colonoscopy tomorrow.     Procedure(s) (LRB):  COLONOSCOPY (Left)      Hospital Course:   Patient admitted for hematochezia. GI consulted. On 10/17, colonoscopy showed non-bleeding internal hemorrhoids, three 4 to 5 mm polyps in the sigmoid colon, one 5 mm polyp in the ascending colon, one 50 mm polyp at the recto-sigmoid colon mild diverticulosis in the sigmoid colon, and was no evidence of diverticular bleeding. No further bleeding and hgb stable while stay. Follow up with GI for biopsy results. If the large rectosigmoid polyp is malignant then will need further imaging and refer to Surgery. Patient stable for discharge.        Consults:     No new Assessment & Plan notes have been filed under this hospital service since the last note was generated.  Service: Hospital Medicine    Final Active Diagnoses:    Diagnosis Date Noted POA    PRINCIPAL PROBLEM:  Bright red blood per rectum [K62.5] 10/16/2018 Yes    Essential hypertension [I10] 10/16/2018 Yes      Problems Resolved During this Admission:       Discharged Condition: good    Disposition: Home or Self Care    Follow Up:  Follow-up Information     Mendoza Townsend III, MD On 10/30/2018.    Specialty:  Internal Medicine  Why:  Outpatient Services PCP " Follow-Up Tuesday at 2:45 PM.  Contact information:  [address]  [facility]  [place] [address]  [phone]                 Patient Instructions:      Diet Cardiac     Activity as tolerated         Pending Diagnostic Studies:     None         Medications:  Reconciled Home Medications:      Medication List      CONTINUE taking these medications    benazepril-hydrochlorthiazide 10-12.5 mg Tab  Commonly known as:  LOTENSIN HCT  Take 1 tablet by mouth once daily.            Indwelling Lines/Drains at time of discharge:   Lines/Drains/Airways     Airway                 Airway - Non-Surgical 10/17/18 1334 Nasal Cannula 1 day                Time spent on the discharge of patient: 30 minutes  Patient was seen and examined on the date of discharge and determined to be suitable for discharge.         Wendi Gregorio NP  Department of Hospital Medicine  [facility]

## 2018-10-19 NOTE — ANESTHESIA PREPROCEDURE EVALUATION
10/19/2018  Mendoza Kulkarni is a 58 y.o., male.    Anesthesia Evaluation    I have reviewed the Patient Summary Reports.    I have reviewed the Nursing Notes.   I have reviewed the Medications.     Review of Systems  Anesthesia Hx:  No problems with previous Anesthesia  History of prior surgery of interest to airway management or planning: Denies Family Hx of Anesthesia complications.   Denies Personal Hx of Anesthesia complications.       Physical Exam  General:  Well nourished    Airway/Jaw/Neck:  Airway Findings: Mouth Opening: Normal Tongue: Normal  General Airway Assessment: Adult  Mallampati: II     Eyes/Ears/Nose:  Eyes/Ears/Nose Findings:          Mental Status:  Mental Status Findings:  Cooperative, Alert and Oriented         Anesthesia Plan  Type of Anesthesia, risks & benefits discussed:  Anesthesia Type:  MAC, general  Patient's Preference:   Intra-op Monitoring Plan: standard ASA monitors  Intra-op Monitoring Plan Comments:   Post Op Pain Control Plan: multimodal analgesia, IV/PO Opioids PRN and per primary service following discharge from PACU  Post Op Pain Control Plan Comments:   Induction:   IV  Beta Blocker:  Patient is not currently on a Beta-Blocker (No further documentation required).       Informed Consent: Patient understands risks and agrees with Anesthesia plan.  Questions answered. Anesthesia consent signed with patient.  ASA Score: 2     Day of Surgery Review of History & Physical:     H&P completed by Anesthesiologist.       Ready For Surgery From Anesthesia Perspective.

## 2018-10-22 NOTE — ANESTHESIA POSTPROCEDURE EVALUATION
"Anesthesia Post Evaluation    Patient: Mendoza Kulkarni    Procedure(s) Performed: Procedure(s) (LRB):  COLONOSCOPY (Left)    Final Anesthesia Type: MAC  Patient location during evaluation: GI PACU  Patient participation: Yes- Able to Participate  Level of consciousness: awake and alert, oriented and awake  Post-procedure vital signs: reviewed and stable  Pain management: adequate  Airway patency: patent  PONV status at discharge: No PONV  Anesthetic complications: no      Cardiovascular status: blood pressure returned to baseline and hemodynamically stable  Respiratory status: unassisted, spontaneous ventilation and room air  Hydration status: euvolemic  Follow-up not needed.        Visit Vitals  /69   Pulse 62   Temp 36.8 °C (98.2 °F) (Oral)   Resp 18   Ht 5' 9" (1.753 m)   Wt 68.5 kg (151 lb 0.2 oz)   SpO2 98%   BMI 22.30 kg/m²       Pain/Hang Score: No Data Recorded      "

## 2020-03-31 ENCOUNTER — HOSPITAL ENCOUNTER (EMERGENCY)
Facility: HOSPITAL | Age: 60
Discharge: HOME | End: 2020-03-31
Attending: EMERGENCY MEDICINE
Payer: MEDICAID

## 2020-03-31 VITALS
HEART RATE: 66 BPM | TEMPERATURE: 98 F | WEIGHT: 150 LBS | DIASTOLIC BLOOD PRESSURE: 63 MMHG | RESPIRATION RATE: 18 BRPM | OXYGEN SATURATION: 100 % | HEIGHT: 68 IN | SYSTOLIC BLOOD PRESSURE: 126 MMHG | BODY MASS INDEX: 22.73 KG/M2

## 2020-03-31 DIAGNOSIS — S39.012A STRAIN OF LUMBAR REGION, INITIAL ENCOUNTER: Primary | ICD-10-CM

## 2020-03-31 PROCEDURE — 25000003 PHARM REV CODE 250: Performed by: PHYSICIAN ASSISTANT

## 2020-03-31 PROCEDURE — 99284 EMERGENCY DEPT VISIT MOD MDM: CPT

## 2020-03-31 RX ORDER — LIDOCAINE 50 MG/G
1 PATCH TOPICAL DAILY
Qty: 15 PATCH | Refills: 0 | Status: SHIPPED | OUTPATIENT
Start: 2020-03-31

## 2020-03-31 RX ORDER — NAPROXEN 500 MG/1
500 TABLET ORAL 2 TIMES DAILY WITH MEALS
Qty: 10 TABLET | Refills: 0 | Status: SHIPPED | OUTPATIENT
Start: 2020-03-31

## 2020-03-31 RX ORDER — HYDROCODONE BITARTRATE AND ACETAMINOPHEN 5; 325 MG/1; MG/1
1 TABLET ORAL
Status: COMPLETED | OUTPATIENT
Start: 2020-03-31 | End: 2020-03-31

## 2020-03-31 RX ORDER — HYDROCODONE BITARTRATE AND ACETAMINOPHEN 5; 325 MG/1; MG/1
1 TABLET ORAL EVERY 4 HOURS PRN
Qty: 18 TABLET | Refills: 0 | Status: SHIPPED | OUTPATIENT
Start: 2020-03-31

## 2020-03-31 RX ORDER — NAPROXEN 500 MG/1
500 TABLET ORAL
Status: COMPLETED | OUTPATIENT
Start: 2020-03-31 | End: 2020-03-31

## 2020-03-31 RX ORDER — LIDOCAINE 50 MG/G
1 PATCH TOPICAL
Status: DISCONTINUED | OUTPATIENT
Start: 2020-03-31 | End: 2020-03-31 | Stop reason: HOSPADM

## 2020-03-31 RX ORDER — ATORVASTATIN CALCIUM 10 MG/1
10 TABLET, FILM COATED ORAL DAILY
COMMUNITY

## 2020-03-31 RX ADMIN — NAPROXEN 500 MG: 500 TABLET ORAL at 08:03

## 2020-03-31 RX ADMIN — LIDOCAINE 1 PATCH: 50 PATCH CUTANEOUS at 08:03

## 2020-03-31 RX ADMIN — HYDROCODONE BITARTRATE AND ACETAMINOPHEN 1 TABLET: 5; 325 TABLET ORAL at 08:03

## 2020-03-31 NOTE — ED PROVIDER NOTES
Encounter Date: 3/31/2020    SCRIBE #1 NOTE: I, Sanna Barajas, am scribing for, and in the presence of,  Allie Fountain PA-C. I have scribed the following portions of the note - Other sections scribed: HPI, ROS, PE.       History     Chief Complaint   Patient presents with    Back Pain     pt with lower back pain radiating down right buttock. Starting 2 days ago. Last had tylenol 1 hour PTA      CC: Back Pain    HPI:  This is a 59 y.o. male who presents to the Emergency Department with a cc of lower back pain that began four days ago. His pain has been gradually worsening and radiating to his right buttock. The patient states that he injured his back while lifting a tire for a friend 4 days ago. The patient also mentions nasal congestion, but denies fever, cough, CP, or SOB. His symptoms are worsened with standing. The patient took Tylenol for his symptoms with some relief. His last dose was one hour pta. The patient denies focal weakness, numbness, saddle anesthesia, or urinary/fecal incontinence.     The history is provided by the patient.     Review of patient's allergies indicates:  No Known Allergies  Past Medical History:   Diagnosis Date    Hemorrhoids     Hypertension      Past Surgical History:   Procedure Laterality Date    COLONOSCOPY Left 10/17/2018    Procedure: COLONOSCOPY;  Surgeon: Rolando Mcclendon MD;  Location: [facility];  Service: Endoscopy;  Laterality: Left;     History reviewed. No pertinent family history.  Social History     Tobacco Use    Smoking status: Current Every Day Smoker     Packs/day: 0.50     Years: 35.00     Pack years: 17.50    Smokeless tobacco: Never Used   Substance Use Topics    Alcohol use: No     Frequency: Never    Drug use: No     Review of Systems   Constitutional: Negative for chills and fever.   HENT: Positive for congestion. Negative for sore throat.    Eyes: Negative for visual disturbance.   Respiratory: Negative for cough and shortness of breath.     Cardiovascular: Negative for chest pain.   Gastrointestinal: Negative for abdominal pain, diarrhea, nausea and vomiting.   Genitourinary: Negative for dysuria.        No incontinence   Musculoskeletal: Positive for back pain. Negative for neck pain.   Skin: Negative for rash.   Allergic/Immunologic: Negative for immunocompromised state.   Neurological: Negative for headaches.   Psychiatric/Behavioral: Negative for dysphoric mood.       Physical Exam     Initial Vitals [03/31/20 0809]   BP Pulse Resp Temp SpO2   (!) 148/65 (!) 53 18 97.6 °F (36.4 °C) 100 %      MAP       --         Physical Exam    Nursing note and vitals reviewed.  Constitutional: He appears well-developed and well-nourished. He is not diaphoretic. No distress.   HENT:   Head: Normocephalic and atraumatic.   Eyes: Conjunctivae and EOM are normal. Pupils are equal, round, and reactive to light.   Neck: Normal range of motion. Neck supple.   Cardiovascular: Normal rate, regular rhythm, normal heart sounds and intact distal pulses.   Pulmonary/Chest: Breath sounds normal. No respiratory distress. He has no wheezes. He has no rales.   Musculoskeletal:   No C/T/L/S midline spinal tenderness. TTP to the right lumbar paraspinal muscles and right buttock. Slightly reduced strength on the right lower extremities secondary to pain. Sensation intact bilaterally.    Neurological: He is alert and oriented to person, place, and time. GCS score is 15. GCS eye subscore is 4. GCS verbal subscore is 5. GCS motor subscore is 6.   Skin: Skin is warm and dry.   Psychiatric: He has a normal mood and affect. Thought content normal.         ED Course   Procedures  Labs Reviewed - No data to display       Imaging Results    None                APC / Resident Notes:   59 year old patient presenting 2/2 back pain most consistent with musculoskeletal strain vs sciatica.  Patient was given Lidocaine patch, Naproxen, and Norco for pain and discharged with the aforementioned.      Low suspicion for acute cord compression or cauda equina at this time, given presentation and symptoms, including epidural abscess or hematoma. Patient has no history of malignancy, active or distant history.  Patient has no unexplained weight loss. No recent fevers, rigors, malaise, or recent infection.  No history of IVDU or skin-popping.  Patient does not have any history concerning for saddle anesthesia/perianal sensory loss or complaining of decreased rectal tone. Patient does not have urinary retention or inability to control urine from overflow.  Patient has no tenderness overlying spinous process. Patient has no focal weakness on examination.    Upon reassessment, patient able to stand and ambulate without difficulty, significant improvement from presentation. Given exam and history, low suspicion for cord compression, cauda equina, epidural abscess/hematoma. Distally neurovascuarly intact. Query likely musculoskeletal component versus sciatica. Discussed pain control, physical therapy and follow up with PMD. Cautious return precautions discussed w/ full understanding             Scribe Attestation:   Scribe #1: I performed the above scribed service and the documentation accurately describes the services I performed. I attest to the accuracy of the note.                          Clinical Impression:       ICD-10-CM ICD-9-CM   1. Strain of lumbar region, initial encounter S39.012A 847.2         Disposition:   Disposition: Discharged  Condition: Stable     ED Disposition Condition    Discharge Stable       Scribe attestation: I, Allie Fountain, personally performed the services described in this documentation. All medical record entries made by the scribe were at my direction and in my presence. I have reviewed the chart and agree that the record reflects my personal performance and is accurate and complete      ED Prescriptions     Medication Sig Dispense Start Date End Date Auth. Provider    lidocaine  (LIDODERM) 5 % Place 1 patch onto the skin once daily. Remove & Discard patch within 12 hours or as directed by MD 15 patch 3/31/2020  Allie Fountain PA-C    naproxen (NAPROSYN) 500 MG tablet Take 1 tablet (500 mg total) by mouth 2 (two) times daily with meals. 10 tablet 3/31/2020  Allie Fountain PA-C    HYDROcodone-acetaminophen (NORCO) 5-325 mg per tablet Take 1 tablet by mouth every 4 (four) hours as needed for Pain. 18 tablet 3/31/2020  Allie Fountain PA-C        Follow-up Information     Follow up With Specialties Details Why Contact Info    Mendoza Townsend III, MD Internal Medicine Schedule an appointment as soon as possible for a visit   [address] [address]  [facility]  [place] [address]  [phone]                                       Allie Fountain PA-C  03/31/20 3755

## 2020-03-31 NOTE — DISCHARGE INSTRUCTIONS
Apply lidocaine patch to area of pain for 12 hr and remove for 12 hr; only 1 patch in 24 hr.  Take naproxen with meals twice daily for 5 days.  Take Norco every 4 hours as needed for pain relief. Use with caution as this medication makes you drowsy, do not take with alcohol and do not drive while taking this medication.  Alternate ice and heat compresses to area of pain for 15 min every 1-2 hours.  Follow-up with your PCP for further evaluation if symptoms persist.  Return to the ED for any concerning symptoms.    Our goal in the emergency department is to always give you outstanding care and exceptional service. You may receive a survey by mail or e-mail in the next week regarding your experience in our ED. We would greatly appreciate your completing and returning the survey. Your feedback provides us with a way to recognize our staff who give very good care and it helps us learn how to improve when your experience was below our aspiration of excellence.

## 2022-08-24 DIAGNOSIS — Z72.0 TOBACCO USE: Primary | ICD-10-CM

## 2022-08-26 ENCOUNTER — HOSPITAL ENCOUNTER (OUTPATIENT)
Dept: RADIOLOGY | Facility: HOSPITAL | Age: 62
Discharge: HOME | End: 2022-08-26
Attending: GENERAL PRACTICE
Payer: MEDICAID

## 2022-08-26 DIAGNOSIS — Z72.0 TOBACCO USE: ICD-10-CM

## 2022-08-26 PROCEDURE — 71271 CT THORAX LUNG CANCER SCR C-: CPT | Mod: TC

## 2022-08-26 PROCEDURE — 71271 CT THORAX LUNG CANCER SCR C-: CPT | Mod: 26,,, | Performed by: RADIOLOGY

## 2022-08-26 PROCEDURE — 71271 CT CHEST LUNG SCREENING LOW DOSE: ICD-10-PCS | Mod: 26,,, | Performed by: RADIOLOGY

## 2024-02-14 DIAGNOSIS — Z87.891 PERSONAL HISTORY OF NICOTINE DEPENDENCE: Primary | ICD-10-CM

## 2024-10-28 ENCOUNTER — HOSPITAL ENCOUNTER (OUTPATIENT)
Dept: RADIOLOGY | Facility: HOSPITAL | Age: 64
Discharge: HOME | End: 2024-10-28
Attending: GENERAL PRACTICE
Payer: MEDICARE

## 2024-10-28 DIAGNOSIS — Z13.6 ENCOUNTER FOR SCREENING FOR CARDIOVASCULAR DISORDERS: ICD-10-CM

## 2024-10-28 DIAGNOSIS — Z87.891 PERSONAL HISTORY OF NICOTINE DEPENDENCE: Primary | ICD-10-CM

## 2024-10-28 PROCEDURE — 76775 US EXAM ABDO BACK WALL LIM: CPT | Mod: 26,,, | Performed by: INTERNAL MEDICINE

## 2024-10-28 PROCEDURE — 76775 US EXAM ABDO BACK WALL LIM: CPT | Mod: TC

## 2024-11-19 ENCOUNTER — HOSPITAL ENCOUNTER (OUTPATIENT)
Dept: RADIOLOGY | Facility: HOSPITAL | Age: 64
Discharge: HOME | End: 2024-11-19
Attending: GENERAL PRACTICE
Payer: MEDICARE

## 2024-11-19 DIAGNOSIS — Z87.891 PERSONAL HISTORY OF NICOTINE DEPENDENCE: ICD-10-CM

## 2024-11-19 PROCEDURE — 71271 CT THORAX LUNG CANCER SCR C-: CPT | Mod: 26,,, | Performed by: STUDENT IN AN ORGANIZED HEALTH CARE EDUCATION/TRAINING PROGRAM

## 2024-11-19 PROCEDURE — 71271 CT THORAX LUNG CANCER SCR C-: CPT | Mod: TC

## 2024-11-26 ENCOUNTER — HOSPITAL ENCOUNTER (EMERGENCY)
Facility: HOSPITAL | Age: 64
Discharge: HOME | End: 2024-11-26
Attending: EMERGENCY MEDICINE
Payer: MEDICARE

## 2024-11-26 VITALS
BODY MASS INDEX: 26.66 KG/M2 | WEIGHT: 180 LBS | RESPIRATION RATE: 18 BRPM | HEIGHT: 69 IN | DIASTOLIC BLOOD PRESSURE: 76 MMHG | TEMPERATURE: 98 F | OXYGEN SATURATION: 100 % | HEART RATE: 89 BPM | SYSTOLIC BLOOD PRESSURE: 130 MMHG

## 2024-11-26 DIAGNOSIS — J06.9 URI WITH COUGH AND CONGESTION: Primary | ICD-10-CM

## 2024-11-26 LAB
CTP QC/QA: YES
MOLECULAR STREP A: NEGATIVE
POC MOLECULAR INFLUENZA A AGN: NEGATIVE
POC MOLECULAR INFLUENZA B AGN: NEGATIVE
SARS-COV-2 RDRP RESP QL NAA+PROBE: NEGATIVE

## 2024-11-26 PROCEDURE — 87502 INFLUENZA DNA AMP PROBE: CPT

## 2024-11-26 PROCEDURE — 87651 STREP A DNA AMP PROBE: CPT

## 2024-11-26 PROCEDURE — 99284 EMERGENCY DEPT VISIT MOD MDM: CPT

## 2024-11-26 PROCEDURE — 87635 SARS-COV-2 COVID-19 AMP PRB: CPT

## 2024-11-26 RX ORDER — BENZONATATE 100 MG/1
100 CAPSULE ORAL 3 TIMES DAILY PRN
Qty: 20 CAPSULE | Refills: 0 | Status: SHIPPED | OUTPATIENT
Start: 2024-11-26 | End: 2024-12-06

## 2024-11-26 RX ORDER — AZITHROMYCIN 250 MG/1
TABLET, FILM COATED ORAL
Qty: 6 TABLET | Refills: 0 | Status: SHIPPED | OUTPATIENT
Start: 2024-11-26

## 2024-11-26 NOTE — ED PROVIDER NOTES
Encounter Date: 11/26/2024    SCRIBE #1 NOTE: IElla, am scribing for, and in the presence of,  Janina Chadwick PA-C. I have scribed the following portions of the note - Other sections scribed: HPI, ROS.       History     Chief Complaint   Patient presents with    Fever     Generalized body aches x5 days without relief from advil. Patient reports cough, sore throat, and fever.      CC: fever    HPI: This is a 63 y/o M w/ a PMHx of HTN, nicotine dependence who presents to the ED with fever that began three days ago, productive cough phlegm, and sore throat that began four nights ago. Patient reports the fever has resolved, had diarrhea yesterday, took imodium with relief, resolved. Known sick contact friend. Also reports some knee pain. Denies direct trauma or injury. Reports taking daily medications today. No other exacerbating or alleviating factors.  Denies associated nausea, vomiting, hemoptysis, chest pain, dyspnea, wheezing, ear pain, urinary symptoms, or other associated symptoms.     The history is provided by the patient. No  was used.     Review of patient's allergies indicates:  No Known Allergies  Past Medical History:   Diagnosis Date    Hemorrhoids     Hypertension      Past Surgical History:   Procedure Laterality Date    COLONOSCOPY Left 10/17/2018    Procedure: COLONOSCOPY;  Surgeon: Rolando Mcclendon MD;  Location: [facility];  Service: Endoscopy;  Laterality: Left;     No family history on file.  Social History     Tobacco Use    Smoking status: Every Day     Current packs/day: 0.50     Average packs/day: 0.5 packs/day for 35.0 years (17.5 ttl pk-yrs)     Types: Cigarettes    Smokeless tobacco: Never   Substance Use Topics    Alcohol use: No    Drug use: No     Review of Systems   Constitutional:  Positive for fever (subjective, resolved). Negative for chills.   HENT:  Positive for sore throat (d/t cough). Negative for congestion, ear pain and rhinorrhea.    Eyes:   Negative for visual disturbance.   Respiratory:  Positive for cough. Negative for shortness of breath and wheezing.    Cardiovascular:  Negative for chest pain.   Gastrointestinal:  Positive for diarrhea (resolved). Negative for abdominal pain, blood in stool, constipation, nausea and vomiting.   Genitourinary:  Negative for dysuria, frequency and hematuria.   Musculoskeletal:  Positive for arthralgias (knees). Negative for back pain.   Skin:  Negative for rash.   Neurological:  Negative for dizziness, weakness and headaches.       Physical Exam     Initial Vitals [11/26/24 0715]   BP Pulse Resp Temp SpO2   130/76 89 18 98.3 °F (36.8 °C) 100 %      MAP       --         Physical Exam    Nursing note and vitals reviewed.  Constitutional: He appears well-developed and well-nourished. He is not diaphoretic. No distress.   HENT:   Head: Normocephalic and atraumatic.   Right Ear: External ear normal.   Left Ear: External ear normal.   There is mild posterior pharyngeal erythema without tonsillar exudates noted.   Eyes:   Bilateral nasal congestion noted.   Neck: Neck supple.   Normal range of motion.  Cardiovascular:  Normal rate and regular rhythm.           Pulmonary/Chest: No respiratory distress. He has no wheezes. He has no rhonchi. He has no rales.   The patient is noted coughing.   Abdominal: Abdomen is soft. Bowel sounds are normal. He exhibits no distension. There is no abdominal tenderness. There is no rebound and no guarding.   Musculoskeletal:         General: No edema. Normal range of motion.      Cervical back: Normal range of motion and neck supple.     Neurological: He is alert and oriented to person, place, and time.   Skin: Skin is warm. Capillary refill takes less than 2 seconds.         ED Course   Procedures  Labs Reviewed   POCT INFLUENZA A/B MOLECULAR       Result Value    POC Molecular Influenza A Ag Negative      POC Molecular Influenza B Ag Negative       Acceptable Yes      SARS-COV-2 RDRP GENE    POC Rapid COVID Negative       Acceptable Yes     POCT STREP A MOLECULAR    Molecular Strep A, POC Negative       Acceptable Yes            Imaging Results    None          Medications - No data to display  Medical Decision Making  Amount and/or Complexity of Data Reviewed  Labs: ordered. Decision-making details documented in ED Course.         APC / Resident Notes:   This is an urgent evaluation of a 64-year-old male who presents to the emergency department complaining of body aches, 5 day history of productive cough, sore throat and other flu-like symptoms.  Someone in his apartment complex is sick with similar symptoms.      The patient is currently afebrile and nontoxic in appearance.  Vital signs are stable.  On physical exam, there is posterior pharyngeal erythema without tonsillar exudates.  The patient is also noted coughing.  The bilateral lungs appear to be clear.  The remainder of the physical exam is unremarkable.    My differential diagnosis includes pneumonia, bronchitis, viral URI, bacterial URI, strep pharyngitis, otitis media.  While in the emergency room rapid COVID, flu, strep test were performed were negative.  Due to the length of this patient's symptoms and the description of productive cough, will treat with azithromycin.  I will give Tessalon Perles.  He will need to follow up with his primary care doctor.  Strict and strong return precautions were thoroughly reviewed with the verbalized understanding and agreement.  Currently safe and stable for discharge at this time.     Scribe Attestation:   Scribe #1: I performed the above scribed service and the documentation accurately describes the services I performed. I attest to the accuracy of the note.                               Clinical Impression:  Final diagnoses:  [J06.9] URI with cough and congestion (Primary)          ED Disposition Condition    Discharge Stable          ED  Prescriptions       Medication Sig Dispense Start Date End Date Auth. Provider    azithromycin (Z-JESSIKA) 250 MG tablet Take 2 tablets on day 1 and 1 tablet on day 2 through 5 6 tablet 11/26/2024 -- Janina Chadwick PA-C    benzonatate (TESSALON) 100 MG capsule Take 1 capsule (100 mg total) by mouth 3 (three) times daily as needed for Cough. 20 capsule 11/26/2024 12/6/2024 Janina Chadwick PA-C          Follow-up Information       Follow up With Specialties Details Why Contact Info    Mendoza Townsend III, MD Internal Medicine   [address] [address]  [phone]            I, Janina Chadwick PA-C, personally performed the services described in this documentation. All medical record entries made by the scribe were at my direction and in my presence. I have reviewed the chart and agree that the record reflects my personal performance and is accurate and complete.      DISCLAIMER: This note was prepared with [email or web address] voice recognition transcription software. Garbled syntax, mangled pronouns, and other bizarre constructions may be attributed to that software system.       Janina Chadwick PA-C  11/26/24 0801

## 2024-11-26 NOTE — DISCHARGE INSTRUCTIONS
Take medication as prescribed.  Rest.  Return to the emergency department for any change or concerning symptoms.  Please follow-up with your primary care doctor.